# Patient Record
Sex: MALE | NOT HISPANIC OR LATINO | URBAN - METROPOLITAN AREA
[De-identification: names, ages, dates, MRNs, and addresses within clinical notes are randomized per-mention and may not be internally consistent; named-entity substitution may affect disease eponyms.]

---

## 2024-09-23 ENCOUNTER — OFFICE VISIT (OUTPATIENT)
Dept: URBAN - METROPOLITAN AREA CLINIC 113 | Facility: CLINIC | Age: 49
End: 2024-09-23
Payer: SELF-PAY

## 2024-09-23 ENCOUNTER — DASHBOARD ENCOUNTERS (OUTPATIENT)
Age: 49
End: 2024-09-23

## 2024-09-23 VITALS — WEIGHT: 168.2 LBS | HEIGHT: 71 IN | BODY MASS INDEX: 23.55 KG/M2 | RESPIRATION RATE: 20 BRPM | TEMPERATURE: 97.7 F

## 2024-09-23 DIAGNOSIS — R10.12 LEFT UPPER QUADRANT ABDOMINAL PAIN: ICD-10-CM

## 2024-09-23 DIAGNOSIS — R74.8 ELEVATED LIPASE: ICD-10-CM

## 2024-09-23 DIAGNOSIS — K85.90 ACUTE PANCREATITIS WITHOUT NECROSIS OR INFECTION, UNSPECIFIED: ICD-10-CM

## 2024-09-23 PROCEDURE — 99204 OFFICE O/P NEW MOD 45 MIN: CPT | Performed by: NURSE PRACTITIONER

## 2024-09-23 NOTE — HPI-TODAY'S VISIT:
50yo male presenting for follow-up after hospitalization for acute pancreatitis. He recently experienced an exacerbation of left upper quadrant pain prompting an ER visit. He was ultimately admitted for three days due to acute pancreatitis, treated supportively. This is apparently his second bout of pancreatitis within the last year. He was hospitalized in October of last year due to the same. Etiology has not been determined. He does not drink alcohol but admits to consuming excessive soda/cokes throughout the day. He denies any abdominal symptoms between the episodes, and only presenting symptom is abdominal pain. No nausea or vomiting. His bowel habits are regular without blood per rectum. He does not have a PCP. His symptoms have slowly improved with a liquid diet. Hospital records reviewed- MRI abdomen with and without contrast/MRCP 9/17/2014 was unremarkable.  Normal liver, gallbladder, pancreas. RUQ ultrasound 9/15/2024 showed no acute findings. CT of the abdomen and pelvis with contrast 9/15/2024 was unremarkable. Labs 9/18/2024:H/H14.8/42.3, MCV 85.3, , WBC 7.5.  CMP essentially unremarkable aside from a total bilirubin of 1.4.  Magnesium 1.8. 9/17/2024:Lipase 96.  Amylase 99.04.  TSH 1.32. 9/15/2024:CBC, CMP unremarkable with normal LFTs.  PT/INR 10.8/0.95.  Lipase 1973.  Magnesium 2.1.  Normal phosphorus.